# Patient Record
Sex: FEMALE | Race: WHITE | NOT HISPANIC OR LATINO | Employment: OTHER | URBAN - METROPOLITAN AREA
[De-identification: names, ages, dates, MRNs, and addresses within clinical notes are randomized per-mention and may not be internally consistent; named-entity substitution may affect disease eponyms.]

---

## 2023-04-07 PROBLEM — M70.70 BURSITIS OF HIP: Status: ACTIVE | Noted: 2023-04-07

## 2023-04-07 PROBLEM — H02.401 PTOSIS OF RIGHT UPPER EYELID: Status: ACTIVE | Noted: 2023-04-07

## 2023-04-07 PROBLEM — I10 ESSENTIAL HYPERTENSION: Status: ACTIVE | Noted: 2023-04-07

## 2023-04-07 PROBLEM — N39.46 URGE AND STRESS INCONTINENCE: Status: ACTIVE | Noted: 2023-04-07

## 2023-04-07 PROBLEM — E03.9 HYPOTHYROIDISM: Status: ACTIVE | Noted: 2023-04-07

## 2023-04-07 PROBLEM — K04.7 DENTAL ABSCESS: Status: ACTIVE | Noted: 2023-04-07

## 2023-04-07 PROBLEM — E55.9 VITAMIN D DEFICIENCY: Status: ACTIVE | Noted: 2023-04-07

## 2023-04-07 PROBLEM — E04.9 GOITER: Status: ACTIVE | Noted: 2023-04-07

## 2023-04-07 PROBLEM — M17.9 OSTEOARTHRITIS OF KNEE: Status: ACTIVE | Noted: 2023-04-07

## 2023-04-07 PROBLEM — M15.9 POLYOSTEOARTHRITIS, UNSPECIFIED: Status: ACTIVE | Noted: 2023-04-07

## 2023-04-07 PROBLEM — N95.9 MENOPAUSAL AND POSTMENOPAUSAL DISORDER: Status: ACTIVE | Noted: 2023-04-07

## 2023-04-07 RX ORDER — PERPHENAZINE 2 MG
TABLET ORAL
COMMUNITY

## 2023-04-07 RX ORDER — HYDROCHLOROTHIAZIDE 25 MG/1
1 TABLET ORAL DAILY
COMMUNITY
End: 2023-04-11 | Stop reason: SDUPTHER

## 2023-04-07 RX ORDER — TOLTERODINE 4 MG/1
1 CAPSULE, EXTENDED RELEASE ORAL DAILY
COMMUNITY
Start: 2021-05-11 | End: 2023-04-11 | Stop reason: SDUPTHER

## 2023-04-07 RX ORDER — NIFEDIPINE 30 MG/1
1 TABLET, FILM COATED, EXTENDED RELEASE ORAL DAILY
COMMUNITY
End: 2023-04-11 | Stop reason: SDUPTHER

## 2023-04-07 RX ORDER — CALCIUM CARBONATE 600 MG
600 TABLET ORAL
COMMUNITY

## 2023-04-07 RX ORDER — LEVOTHYROXINE SODIUM 125 UG/1
1 TABLET ORAL DAILY
COMMUNITY
Start: 2019-11-18 | End: 2023-04-11 | Stop reason: SDUPTHER

## 2023-04-09 ASSESSMENT — ENCOUNTER SYMPTOMS
SHORTNESS OF BREATH: 0
ORTHOPNEA: 0
HYPERTENSION: 1
PALPITATIONS: 0
SWEATS: 0
NECK PAIN: 0
HEADACHES: 0
PND: 0
BLURRED VISION: 0

## 2023-04-11 ENCOUNTER — LAB (OUTPATIENT)
Dept: LAB | Facility: LAB | Age: 68
End: 2023-04-11
Payer: MEDICARE

## 2023-04-11 ENCOUNTER — OFFICE VISIT (OUTPATIENT)
Dept: PRIMARY CARE | Facility: CLINIC | Age: 68
End: 2023-04-11
Payer: MEDICARE

## 2023-04-11 VITALS
SYSTOLIC BLOOD PRESSURE: 130 MMHG | HEIGHT: 72 IN | HEART RATE: 84 BPM | RESPIRATION RATE: 14 BRPM | TEMPERATURE: 97.8 F | DIASTOLIC BLOOD PRESSURE: 83 MMHG | BODY MASS INDEX: 28.44 KG/M2 | WEIGHT: 210 LBS | OXYGEN SATURATION: 96 %

## 2023-04-11 DIAGNOSIS — E03.8 HYPOTHYROIDISM DUE TO HASHIMOTO'S THYROIDITIS: ICD-10-CM

## 2023-04-11 DIAGNOSIS — E04.9 GOITER: ICD-10-CM

## 2023-04-11 DIAGNOSIS — I10 ESSENTIAL HYPERTENSION: Primary | ICD-10-CM

## 2023-04-11 DIAGNOSIS — Z00.00 ENCOUNTER FOR MEDICARE ANNUAL WELLNESS EXAM: ICD-10-CM

## 2023-04-11 DIAGNOSIS — E06.3 HYPOTHYROIDISM DUE TO HASHIMOTO'S THYROIDITIS: ICD-10-CM

## 2023-04-11 DIAGNOSIS — D64.9 ANEMIA, UNSPECIFIED TYPE: ICD-10-CM

## 2023-04-11 DIAGNOSIS — N39.46 URGE AND STRESS INCONTINENCE: ICD-10-CM

## 2023-04-11 PROBLEM — K04.7 DENTAL ABSCESS: Status: RESOLVED | Noted: 2023-04-07 | Resolved: 2023-04-11

## 2023-04-11 LAB
ERYTHROCYTE DISTRIBUTION WIDTH (RATIO) BY AUTOMATED COUNT: 13.7 % (ref 11.5–14.5)
ERYTHROCYTE MEAN CORPUSCULAR HEMOGLOBIN CONCENTRATION (G/DL) BY AUTOMATED: 32.9 G/DL (ref 32–36)
ERYTHROCYTE MEAN CORPUSCULAR VOLUME (FL) BY AUTOMATED COUNT: 98 FL (ref 80–100)
ERYTHROCYTES (10*6/UL) IN BLOOD BY AUTOMATED COUNT: 4.66 X10E12/L (ref 4–5.2)
HEMATOCRIT (%) IN BLOOD BY AUTOMATED COUNT: 45.6 % (ref 36–46)
HEMOGLOBIN (G/DL) IN BLOOD: 15 G/DL (ref 12–16)
LEUKOCYTES (10*3/UL) IN BLOOD BY AUTOMATED COUNT: 5 X10E9/L (ref 4.4–11.3)
PLATELETS (10*3/UL) IN BLOOD AUTOMATED COUNT: 270 X10E9/L (ref 150–450)
THYROTROPIN (MIU/L) IN SER/PLAS BY DETECTION LIMIT <= 0.05 MIU/L: 1.06 MIU/L (ref 0.44–3.98)
THYROXINE (T4) FREE (NG/DL) IN SER/PLAS: 1.24 NG/DL (ref 0.61–1.12)

## 2023-04-11 PROCEDURE — 36415 COLL VENOUS BLD VENIPUNCTURE: CPT

## 2023-04-11 PROCEDURE — 85027 COMPLETE CBC AUTOMATED: CPT

## 2023-04-11 PROCEDURE — 1159F MED LIST DOCD IN RCRD: CPT

## 2023-04-11 PROCEDURE — 84443 ASSAY THYROID STIM HORMONE: CPT

## 2023-04-11 PROCEDURE — 99214 OFFICE O/P EST MOD 30 MIN: CPT

## 2023-04-11 PROCEDURE — 3075F SYST BP GE 130 - 139MM HG: CPT

## 2023-04-11 PROCEDURE — 1036F TOBACCO NON-USER: CPT

## 2023-04-11 PROCEDURE — 84439 ASSAY OF FREE THYROXINE: CPT

## 2023-04-11 PROCEDURE — 3079F DIAST BP 80-89 MM HG: CPT

## 2023-04-11 RX ORDER — LEVOTHYROXINE SODIUM 125 UG/1
125 TABLET ORAL DAILY
Qty: 90 TABLET | Refills: 3 | Status: SHIPPED | OUTPATIENT
Start: 2023-04-11 | End: 2024-04-23 | Stop reason: SDUPTHER

## 2023-04-11 RX ORDER — TOLTERODINE 4 MG/1
4 CAPSULE, EXTENDED RELEASE ORAL DAILY
Qty: 90 CAPSULE | Refills: 3 | Status: SHIPPED | OUTPATIENT
Start: 2023-04-11 | End: 2024-04-10

## 2023-04-11 RX ORDER — HYDROCHLOROTHIAZIDE 25 MG/1
25 TABLET ORAL DAILY
Qty: 90 TABLET | Refills: 3 | Status: SHIPPED | OUTPATIENT
Start: 2023-04-11 | End: 2024-04-23 | Stop reason: SDUPTHER

## 2023-04-11 RX ORDER — NIFEDIPINE 30 MG/1
30 TABLET, FILM COATED, EXTENDED RELEASE ORAL DAILY
Qty: 90 TABLET | Refills: 3 | Status: SHIPPED | OUTPATIENT
Start: 2023-04-11 | End: 2024-04-23 | Stop reason: SDUPTHER

## 2023-04-11 ASSESSMENT — ENCOUNTER SYMPTOMS
CARDIOVASCULAR NEGATIVE: 1
HEADACHES: 0
LOSS OF SENSATION IN FEET: 0
GASTROINTESTINAL NEGATIVE: 1
ENDOCRINE NEGATIVE: 1
PSYCHIATRIC NEGATIVE: 1
RESPIRATORY NEGATIVE: 1
SHORTNESS OF BREATH: 0
NEUROLOGICAL NEGATIVE: 1
OCCASIONAL FEELINGS OF UNSTEADINESS: 0
PALPITATIONS: 0
HEMATOLOGIC/LYMPHATIC NEGATIVE: 1
DEPRESSION: 0
MUSCULOSKELETAL NEGATIVE: 1
EYES NEGATIVE: 1
NECK PAIN: 0
CONSTITUTIONAL NEGATIVE: 1

## 2023-04-11 ASSESSMENT — PATIENT HEALTH QUESTIONNAIRE - PHQ9
1. LITTLE INTEREST OR PLEASURE IN DOING THINGS: NOT AT ALL
2. FEELING DOWN, DEPRESSED OR HOPELESS: NOT AT ALL
SUM OF ALL RESPONSES TO PHQ9 QUESTIONS 1 & 2: 0

## 2023-04-11 ASSESSMENT — LIFESTYLE VARIABLES
AUDIT-C TOTAL SCORE: 0
HOW MANY STANDARD DRINKS CONTAINING ALCOHOL DO YOU HAVE ON A TYPICAL DAY: PATIENT DOES NOT DRINK
HOW OFTEN DO YOU HAVE SIX OR MORE DRINKS ON ONE OCCASION: NEVER
HOW OFTEN DO YOU HAVE A DRINK CONTAINING ALCOHOL: NEVER
SKIP TO QUESTIONS 9-10: 1

## 2023-04-11 NOTE — PROGRESS NOTES
Answers submitted by the patient for this visit:  High Blood Pressure Questionnaire (Submitted on 4/9/2023)  Chief Complaint: Hypertension  Chronicity: chronic  Onset: more than 1 year ago  Progression since onset: waxing and waning  Condition status: controlled  anxiety: No  blurred vision: No  chest pain: No  headaches: No  malaise/fatigue: No  neck pain: No  orthopnea: No  palpitations: No  peripheral edema: No  PND: No  shortness of breath: No  sweats: No  Agents associated with hypertension: NSAIDs, thyroid hormones  CAD risks: family history, post-menopausal state  Compliance problems: no compliance problems    Subjective   Patient ID: Candy Maya is a 67 y.o. female who presents for visit to [facility] care and 6 month follow up visit.    Diet: More veggies, low carbs, low fat, higher protein. No sugary beverages, sweets, trying to avoid processed foods/ chips.   Exercise: Going to the gym 4-5 times per week for about 1 hour per time (20 minutes bike or treadmill, rest of the time on machines)  Weight: Down 10 lbs since last visit  Water: 90 oz water minimum per day. 2 cups coffee per day  Sleep: Not getting a lot of deep sleep per her Fitbit, 7-8 hours per night. Sleep score in high 80s per fitbit  Social: , lives in a small cottage on [address]. Used to live around [place], hasn't found a doctor in [place] yet.   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    HENT: Negative.     Eyes: Negative.    Respiratory: Negative.  Negative for shortness of breath.    Cardiovascular: Negative.  Negative for chest pain and palpitations.   Gastrointestinal: Negative.    Endocrine: Negative.    Genitourinary: Negative.    Musculoskeletal: Negative.  Negative for neck pain.   Skin: Negative.    Neurological: Negative.  Negative for headaches.   Hematological: Negative.    Psychiatric/Behavioral: Negative.          Current Outpatient Medications   Medication Sig  "Dispense Refill    calcium carbonate 600 mg calcium (1,500 mg) tablet Take 1 tablet (600 mg) by mouth in the morning and 1 tablet (600 mg) in the evening. Take with meals.      fish,bora,flax oils-om3,6,9no1 (Omega 3-6-9) 1,200 mg capsule Take by mouth.      multivitamin with minerals (multivitamin-iron-folic acid) tablet Take 1 tablet by mouth once daily.      hydroCHLOROthiazide (HYDRODiuril) 25 mg tablet Take 1 tablet (25 mg) by mouth once daily. 90 tablet 3    levothyroxine (Synthroid, Levoxyl) 125 mcg tablet Take 1 tablet (125 mcg) by mouth once daily. 90 tablet 3    NIFEdipine CC (Adalat CC) 30 mg 24 hr tablet Take 1 tablet (30 mg) by mouth once daily. 90 tablet 3    tolterodine LA (Detrol LA) 4 mg 24 hr capsule Take 1 capsule (4 mg) by mouth once daily. 90 capsule 3     No current facility-administered medications for this visit.     Past Surgical History:   Procedure Laterality Date    OTHER SURGICAL HISTORY  11/12/2019    Bladder surgery    OTHER SURGICAL HISTORY  11/12/2019    Varicose vein ligation    OTHER SURGICAL HISTORY  11/12/2019    Jaw surgery    OTHER SURGICAL HISTORY  11/12/2019    Appendectomy    OTHER SURGICAL HISTORY  11/12/2019    Cholecystectomy    OTHER SURGICAL HISTORY  11/12/2019    Hysterectomy    OTHER SURGICAL HISTORY  11/18/2019    Cataract surgery    OTHER SURGICAL HISTORY  10/17/2022    Knee replacement     Family History   Problem Relation Name Age of Onset    Breast cancer Paternal Grandmother      Cancer Other        Social History     Tobacco Use    Smoking status: Never     Passive exposure: Never    Smokeless tobacco: Never   Vaping Use    Vaping status: Never Used   Substance Use Topics    Alcohol use: Not Currently    Drug use: Never        Objective     Visit Vitals  /83 (BP Location: Left arm, Patient Position: Sitting, BP Cuff Size: Large adult)   Pulse 84   Temp 36.6 °C (97.8 °F)   Resp 14   Ht 1.835 m (6' 0.25\")   Wt 95.3 kg (210 lb)   SpO2 96%   BMI 28.28 kg/m² "   Smoking Status Never   BSA 2.2 m²        Physical Exam  Constitutional:       Appearance: Normal appearance.   HENT:      Head: Normocephalic and atraumatic.   Eyes:      Extraocular Movements: Extraocular movements intact.      Pupils: Pupils are equal, round, and reactive to light.   Neck:      Comments: Goiter  Cardiovascular:      Rate and Rhythm: Normal rate and regular rhythm.   Pulmonary:      Effort: Pulmonary effort is normal.      Breath sounds: Normal breath sounds.   Abdominal:      General: Abdomen is flat. Bowel sounds are normal.      Palpations: Abdomen is soft.   Musculoskeletal:         General: Normal range of motion.   Neurological:      General: No focal deficit present.      Mental Status: She is alert and oriented to person, place, and time.   Psychiatric:         Mood and Affect: Mood normal.         Behavior: Behavior normal.           Assessment/Plan   Problem List Items Addressed This Visit       Essential hypertension - Primary     Generally well controlled in office today at 130/83  Continue hydrochlorothiazide 25mg daily and nifedipine 30mg daily  Advised to check blood pressures a few times per month and record values         Relevant Medications    NIFEdipine CC (Adalat CC) 30 mg 24 hr tablet    hydroCHLOROthiazide (HYDRODiuril) 25 mg tablet    Goiter    Hypothyroidism    Relevant Medications    levothyroxine (Synthroid, Levoxyl) 125 mcg tablet    Other Relevant Orders    T4, free    TSH    Urge and stress incontinence    Relevant Medications    tolterodine LA (Detrol LA) 4 mg 24 hr capsule    Anemia     Repeat CBC  Add on Iron/ferritin/B12 as needed pending results             Relevant Orders    CBC       All pertinent lab work and results were reviewed with patient.     Follow up with me in 6 months or sooner as needed    Janie Malhotra, APRN-CNS

## 2023-04-11 NOTE — ASSESSMENT & PLAN NOTE
Generally well controlled in office today at 130/83  Continue hydrochlorothiazide 25mg daily and nifedipine 30mg daily  Advised to check blood pressures a few times per month and record values

## 2023-04-11 NOTE — PATIENT INSTRUCTIONS
Thank you for coming to see me today.  If you have any questions or concerns following our visit, please contact the office.  Phone: [phone]    Follow up with me in 6 months or sooner as needed    1)  Check blood pressures a few times per month, record values, let me know if top number consistently greater than 140 or bottom number consistently greater than 86    2) Get labs today on your way out to check for anemia and thyroid function

## 2023-04-12 ENCOUNTER — TELEPHONE (OUTPATIENT)
Dept: PRIMARY CARE | Facility: CLINIC | Age: 68
End: 2023-04-12

## 2023-06-28 NOTE — RESULT ENCOUNTER NOTE
T4 a little high but if she's not feeling anxious, heat intolerant or any other symptoms of high thyroid will leave levothyroxine dose the same for now.  Labs otherwise look ok

## 2023-10-11 ENCOUNTER — TELEMEDICINE (OUTPATIENT)
Dept: PRIMARY CARE | Facility: CLINIC | Age: 68
End: 2023-10-11
Payer: MEDICARE

## 2023-10-11 DIAGNOSIS — I10 ESSENTIAL HYPERTENSION: ICD-10-CM

## 2023-10-11 DIAGNOSIS — Z12.31 BREAST CANCER SCREENING BY MAMMOGRAM: ICD-10-CM

## 2023-10-11 DIAGNOSIS — Z00.00 ENCOUNTER FOR MEDICARE ANNUAL WELLNESS EXAM: ICD-10-CM

## 2023-10-11 DIAGNOSIS — R79.89 ABNORMAL CBC: ICD-10-CM

## 2023-10-11 DIAGNOSIS — D64.9 ANEMIA, UNSPECIFIED TYPE: ICD-10-CM

## 2023-10-11 DIAGNOSIS — E03.9 HYPOTHYROIDISM, UNSPECIFIED TYPE: ICD-10-CM

## 2023-10-11 DIAGNOSIS — E55.9 VITAMIN D DEFICIENCY: ICD-10-CM

## 2023-10-11 DIAGNOSIS — Z78.0 POST-MENOPAUSAL: Primary | ICD-10-CM

## 2023-10-11 PROCEDURE — 99214 OFFICE O/P EST MOD 30 MIN: CPT

## 2023-10-11 ASSESSMENT — ENCOUNTER SYMPTOMS
HEMATOLOGIC/LYMPHATIC NEGATIVE: 1
EYES NEGATIVE: 1
MUSCULOSKELETAL NEGATIVE: 1
GASTROINTESTINAL NEGATIVE: 1
ENDOCRINE NEGATIVE: 1
PSYCHIATRIC NEGATIVE: 1
RESPIRATORY NEGATIVE: 1
CARDIOVASCULAR NEGATIVE: 1
NEUROLOGICAL NEGATIVE: 1
CONSTITUTIONAL NEGATIVE: 1

## 2023-10-11 NOTE — PROGRESS NOTES
Subjective   Patient ID: Candy Maya is a 68 y.o. female who presents for follow up of hypertension.    Checking blood pressure at home ranging 120s/80s at home, checking them about a few times per month.   A few weeks ago  Planning to leave for [place] in the coming weeks. Will be there through first of April.     Diet: More veggies, low carbs, low fat, higher protein. No sugary beverages, sweets, trying to avoid processed foods/ chips.   Exercise: Going to the gym 4-5 times per week for about 1 hour per time (20 minutes bike or treadmill, rest of the time on machines)  Weight: Down 10 lbs since last visit  Water: 90 oz water minimum per day. 2 cups coffee per day  Sleep: Not getting a lot of deep sleep per her Fitbit, 7-8 hours per night. Sleep score in high 80s per fitbit  Social: , [facility] [place] [facility] [place] [address]. Used to live around [place], hasn't found a doctor in [place] yet.   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    HENT: Negative.     Eyes: Negative.    Respiratory: Negative.     Cardiovascular: Negative.    Gastrointestinal: Negative.    Endocrine: Negative.    Genitourinary: Negative.    Musculoskeletal: Negative.    Skin: Negative.    Neurological: Negative.    Hematological: Negative.    Psychiatric/Behavioral: Negative.          Current Outpatient Medications   Medication Sig Dispense Refill    calcium carbonate 600 mg calcium (1,500 mg) tablet Take 1 tablet (600 mg) by mouth in the morning and 1 tablet (600 mg) in the evening. Take with meals.      fish,bora,flax oils-om3,6,9no1 (Omega 3-6-9) 1,200 mg capsule Take by mouth.      hydroCHLOROthiazide (HYDRODiuril) 25 mg tablet Take 1 tablet (25 mg) by mouth once daily. 90 tablet 3    levothyroxine (Synthroid, Levoxyl) 125 mcg tablet Take 1 tablet (125 mcg) by mouth once daily. 90 tablet 3    multivitamin with minerals (multivitamin-iron-folic acid) tablet Take 1 tablet by mouth  once daily.      NIFEdipine CC (Adalat CC) 30 mg 24 hr tablet Take 1 tablet (30 mg) by mouth once daily. 90 tablet 3    tolterodine LA (Detrol LA) 4 mg 24 hr capsule Take 1 capsule (4 mg) by mouth once daily. 90 capsule 3     No current facility-administered medications for this visit.     Past Surgical History:   Procedure Laterality Date    APPENDECTOMY  35 yrs ago    CHOLECYSTECTOMY  20 yrs ago    COSMETIC SURGERY  20 yrs ago    HYSTERECTOMY  02/2010    JOINT REPLACEMENT  06/2022    OTHER SURGICAL HISTORY  11/12/2019    Bladder surgery    OTHER SURGICAL HISTORY  11/12/2019    Varicose vein ligation    OTHER SURGICAL HISTORY  11/12/2019    Jaw surgery    OTHER SURGICAL HISTORY  11/12/2019    Appendectomy    OTHER SURGICAL HISTORY  11/12/2019    Cholecystectomy    OTHER SURGICAL HISTORY  11/12/2019    Hysterectomy    OTHER SURGICAL HISTORY  11/18/2019    Cataract surgery    OTHER SURGICAL HISTORY  10/17/2022    Knee replacement    TONSILLECTOMY  60 yrs ago    WISDOM TOOTH EXTRACTION  45 yrs ago     Family History   Problem Relation Name Age of Onset    Breast cancer Paternal Grandmother Flory Ryan     Cancer Paternal Grandmother Flory Davis     Cancer Other      Arthritis Mother Angely Davis     Hearing loss Mother Angely Davis     Heart disease Mother Angely Davis     Hearing loss Father Amadeo Davis     Vision loss Father Amadeo Davis     Cancer Maternal Grandmother Nancy Rosado     Accidental death Brother Tristan Davis     Accidental death Sister Nuvia Davis       Social History     Tobacco Use    Smoking status: Never     Passive exposure: Never    Smokeless tobacco: Never   Vaping Use    Vaping Use: Never used   Substance Use Topics    Alcohol use: Yes     Alcohol/week: 4.0 - 5.0 standard drinks of alcohol     Types: 2 Cans of beer, 2 - 3 Shots of liquor per week     Comment: Very occasionally/1-2 times per month    Drug use: Never        Objective     Visit Vitals  Smoking Status Never         Physical Exam  Constitutional:       Appearance: Normal appearance.   HENT:      Head: Normocephalic and atraumatic.   Eyes:      Extraocular Movements: Extraocular movements intact.      Pupils: Pupils are equal, round, and reactive to light.   Musculoskeletal:         General: Normal range of motion.   Neurological:      General: No focal deficit present.      Mental Status: She is alert and oriented to person, place, and time.   Psychiatric:         Mood and Affect: Mood normal.         Behavior: Behavior normal.           Assessment/Plan   Problem List Items Addressed This Visit       Essential hypertension     Home blood pressures running 120/80s, checked a few times over the past 6 months    Continue hydrochlorothiazide 25mg daily and nifedipine 30mg daily         Relevant Orders    CBC    Comprehensive Metabolic Panel    Lipid Panel    Hypothyroidism     TSH WNL, T4 elevated but patient feeling well, without heat intolerate, anxiety, tachycardia, etc    Continue levothyroxine 125mcg daily   Repeat TSH w/ reflex to T4         Relevant Orders    TSH with reflex to Free T4 if abnormal    Vitamin D deficiency    Relevant Orders    Vitamin D 25-Hydroxy,Total (for eval of Vitamin D levels)    Anemia     Noted to have low MCHC and mildly decreased H/H on CBC from 9/2022    Repeat CBC; check B12, iron studies          Other Visit Diagnoses       Post-menopausal    -  Primary    Relevant Orders    XR DEXA bone density    Encounter for Medicare annual wellness exam        Breast cancer screening by mammogram        Relevant Orders    BI mammo bilateral screening tomosynthesis    Abnormal CBC        Relevant Orders    Ferritin    Iron and TIBC    Vitamin B12            All pertinent lab work and results were reviewed with patient.     Follow up with me in 6 months or sooner as needed    GEOFF Heath-CNS

## 2023-10-11 NOTE — PATIENT INSTRUCTIONS
Thank you for coming to see me today.  If you have any questions or concerns following our visit, please contact the office.  Phone: [phone]    Follow up with me in 6 months or sooner as needed    1)  Get fasting labwork in the next 1-2 weeks.  The lab is down the yi from our office.     2) Please schedule a mammogram and bone density scan - please call [phone] or stop to 's office (in the lab office) on your way out today.     3)

## 2023-10-11 NOTE — ASSESSMENT & PLAN NOTE
Noted to have low MCHC and mildly decreased H/H on CBC from 9/2022    Repeat CBC; check B12, iron studies

## 2023-10-11 NOTE — ASSESSMENT & PLAN NOTE
Home blood pressures running 120/80s, checked a few times over the past 6 months    Continue hydrochlorothiazide 25mg daily and nifedipine 30mg daily

## 2023-10-11 NOTE — ASSESSMENT & PLAN NOTE
TSH WNL, T4 elevated but patient feeling well, without heat intolerate, anxiety, tachycardia, etc    Continue levothyroxine 125mcg daily   Repeat TSH w/ reflex to T4

## 2023-10-17 ENCOUNTER — LAB (OUTPATIENT)
Dept: LAB | Facility: LAB | Age: 68
End: 2023-10-17
Payer: MEDICARE

## 2023-10-17 DIAGNOSIS — I10 ESSENTIAL HYPERTENSION: ICD-10-CM

## 2023-10-17 DIAGNOSIS — E55.9 VITAMIN D DEFICIENCY: ICD-10-CM

## 2023-10-17 DIAGNOSIS — R79.89 ABNORMAL CBC: ICD-10-CM

## 2023-10-17 DIAGNOSIS — E03.9 HYPOTHYROIDISM, UNSPECIFIED TYPE: ICD-10-CM

## 2023-10-17 LAB
25(OH)D3 SERPL-MCNC: 58 NG/ML (ref 30–100)
ALBUMIN SERPL BCP-MCNC: 3.9 G/DL (ref 3.4–5)
ALP SERPL-CCNC: 57 U/L (ref 33–136)
ALT SERPL W P-5'-P-CCNC: 13 U/L (ref 7–45)
ANION GAP SERPL CALC-SCNC: 10 MMOL/L (ref 10–20)
AST SERPL W P-5'-P-CCNC: 15 U/L (ref 9–39)
BILIRUB SERPL-MCNC: 0.5 MG/DL (ref 0–1.2)
BUN SERPL-MCNC: 14 MG/DL (ref 6–23)
CALCIUM SERPL-MCNC: 9.2 MG/DL (ref 8.6–10.3)
CHLORIDE SERPL-SCNC: 103 MMOL/L (ref 98–107)
CHOLEST SERPL-MCNC: 192 MG/DL (ref 0–199)
CHOLESTEROL/HDL RATIO: 3.7
CO2 SERPL-SCNC: 33 MMOL/L (ref 21–32)
CREAT SERPL-MCNC: 0.84 MG/DL (ref 0.5–1.05)
ERYTHROCYTE [DISTWIDTH] IN BLOOD BY AUTOMATED COUNT: 12.9 % (ref 11.5–14.5)
FERRITIN SERPL-MCNC: 55 NG/ML (ref 8–150)
GFR SERPL CREATININE-BSD FRML MDRD: 77 ML/MIN/1.73M*2
GLUCOSE SERPL-MCNC: 87 MG/DL (ref 74–99)
HCT VFR BLD AUTO: 42.5 % (ref 36–46)
HDLC SERPL-MCNC: 51.6 MG/DL
HGB BLD-MCNC: 13.8 G/DL (ref 12–16)
IRON SATN MFR SERPL: 32 % (ref 25–45)
IRON SERPL-MCNC: 105 UG/DL (ref 35–150)
LDLC SERPL CALC-MCNC: 114 MG/DL
MCH RBC QN AUTO: 31.6 PG (ref 26–34)
MCHC RBC AUTO-ENTMCNC: 32.5 G/DL (ref 32–36)
MCV RBC AUTO: 97 FL (ref 80–100)
NON HDL CHOLESTEROL: 140 MG/DL (ref 0–149)
NRBC BLD-RTO: 0 /100 WBCS (ref 0–0)
PLATELET # BLD AUTO: 242 X10*3/UL (ref 150–450)
PMV BLD AUTO: 9.7 FL (ref 7.5–11.5)
POTASSIUM SERPL-SCNC: 4 MMOL/L (ref 3.5–5.3)
PROT SERPL-MCNC: 6.5 G/DL (ref 6.4–8.2)
RBC # BLD AUTO: 4.37 X10*6/UL (ref 4–5.2)
SODIUM SERPL-SCNC: 142 MMOL/L (ref 136–145)
TIBC SERPL-MCNC: 331 UG/DL (ref 240–445)
TRIGL SERPL-MCNC: 130 MG/DL (ref 0–149)
TSH SERPL-ACNC: 1.94 MIU/L (ref 0.44–3.98)
UIBC SERPL-MCNC: 226 UG/DL (ref 110–370)
VIT B12 SERPL-MCNC: 377 PG/ML (ref 211–911)
VLDL: 26 MG/DL (ref 0–40)
WBC # BLD AUTO: 4.6 X10*3/UL (ref 4.4–11.3)

## 2023-10-17 PROCEDURE — 83540 ASSAY OF IRON: CPT

## 2023-10-17 PROCEDURE — 82306 VITAMIN D 25 HYDROXY: CPT

## 2023-10-17 PROCEDURE — 84443 ASSAY THYROID STIM HORMONE: CPT

## 2023-10-17 PROCEDURE — 82728 ASSAY OF FERRITIN: CPT

## 2023-10-17 PROCEDURE — 85027 COMPLETE CBC AUTOMATED: CPT

## 2023-10-17 PROCEDURE — 80053 COMPREHEN METABOLIC PANEL: CPT

## 2023-10-17 PROCEDURE — 82607 VITAMIN B-12: CPT

## 2023-10-17 PROCEDURE — 83550 IRON BINDING TEST: CPT

## 2023-10-17 PROCEDURE — 36415 COLL VENOUS BLD VENIPUNCTURE: CPT

## 2023-10-17 PROCEDURE — 80061 LIPID PANEL: CPT

## 2023-10-25 ENCOUNTER — ANCILLARY PROCEDURE (OUTPATIENT)
Dept: RADIOLOGY | Facility: CLINIC | Age: 68
End: 2023-10-25
Payer: MEDICARE

## 2023-10-25 DIAGNOSIS — Z78.0 POST-MENOPAUSAL: ICD-10-CM

## 2023-10-25 DIAGNOSIS — Z12.31 BREAST CANCER SCREENING BY MAMMOGRAM: ICD-10-CM

## 2023-10-25 PROCEDURE — 77080 DXA BONE DENSITY AXIAL: CPT

## 2023-10-25 PROCEDURE — 77067 SCR MAMMO BI INCL CAD: CPT

## 2023-10-25 PROCEDURE — 77063 BREAST TOMOSYNTHESIS BI: CPT | Performed by: RADIOLOGY

## 2023-10-25 PROCEDURE — 77067 SCR MAMMO BI INCL CAD: CPT | Performed by: RADIOLOGY

## 2023-10-25 PROCEDURE — 77080 DXA BONE DENSITY AXIAL: CPT | Performed by: RADIOLOGY

## 2024-04-23 ENCOUNTER — OFFICE VISIT (OUTPATIENT)
Dept: PRIMARY CARE | Facility: CLINIC | Age: 69
End: 2024-04-23
Payer: MEDICARE

## 2024-04-23 VITALS
SYSTOLIC BLOOD PRESSURE: 130 MMHG | HEIGHT: 72 IN | HEART RATE: 87 BPM | TEMPERATURE: 96.8 F | WEIGHT: 210.2 LBS | OXYGEN SATURATION: 95 % | DIASTOLIC BLOOD PRESSURE: 88 MMHG | BODY MASS INDEX: 28.47 KG/M2 | RESPIRATION RATE: 20 BRPM

## 2024-04-23 DIAGNOSIS — E03.8 HYPOTHYROIDISM DUE TO HASHIMOTO'S THYROIDITIS: ICD-10-CM

## 2024-04-23 DIAGNOSIS — M85.80 OSTEOPENIA, UNSPECIFIED LOCATION: ICD-10-CM

## 2024-04-23 DIAGNOSIS — N39.46 URGE AND STRESS INCONTINENCE: ICD-10-CM

## 2024-04-23 DIAGNOSIS — Z00.00 ROUTINE GENERAL MEDICAL EXAMINATION AT HEALTH CARE FACILITY: Primary | ICD-10-CM

## 2024-04-23 DIAGNOSIS — Z00.00 ENCOUNTER FOR MEDICARE ANNUAL WELLNESS EXAM: ICD-10-CM

## 2024-04-23 DIAGNOSIS — I10 ESSENTIAL HYPERTENSION: ICD-10-CM

## 2024-04-23 DIAGNOSIS — E06.3 HYPOTHYROIDISM DUE TO HASHIMOTO'S THYROIDITIS: ICD-10-CM

## 2024-04-23 PROBLEM — N81.2 UTEROVAGINAL PROLAPSE, INCOMPLETE: Status: ACTIVE | Noted: 2024-04-23

## 2024-04-23 PROCEDURE — 3079F DIAST BP 80-89 MM HG: CPT

## 2024-04-23 PROCEDURE — 3075F SYST BP GE 130 - 139MM HG: CPT

## 2024-04-23 PROCEDURE — 1126F AMNT PAIN NOTED NONE PRSNT: CPT

## 2024-04-23 PROCEDURE — 1170F FXNL STATUS ASSESSED: CPT

## 2024-04-23 PROCEDURE — G0439 PPPS, SUBSEQ VISIT: HCPCS

## 2024-04-23 PROCEDURE — 1159F MED LIST DOCD IN RCRD: CPT

## 2024-04-23 PROCEDURE — 99214 OFFICE O/P EST MOD 30 MIN: CPT

## 2024-04-23 PROCEDURE — 1160F RVW MEDS BY RX/DR IN RCRD: CPT

## 2024-04-23 PROCEDURE — 99397 PER PM REEVAL EST PAT 65+ YR: CPT

## 2024-04-23 RX ORDER — TOLTERODINE 4 MG/1
4 CAPSULE, EXTENDED RELEASE ORAL DAILY
COMMUNITY
End: 2024-04-23 | Stop reason: SDUPTHER

## 2024-04-23 RX ORDER — LEVOTHYROXINE SODIUM 125 UG/1
125 TABLET ORAL DAILY
Qty: 90 TABLET | Refills: 3 | Status: SHIPPED | OUTPATIENT
Start: 2024-04-23 | End: 2025-04-23

## 2024-04-23 RX ORDER — HYDROCHLOROTHIAZIDE 25 MG/1
25 TABLET ORAL DAILY
Qty: 90 TABLET | Refills: 3 | Status: SHIPPED | OUTPATIENT
Start: 2024-04-23 | End: 2025-04-23

## 2024-04-23 RX ORDER — TOLTERODINE 4 MG/1
4 CAPSULE, EXTENDED RELEASE ORAL DAILY
Qty: 90 CAPSULE | Refills: 3 | Status: SHIPPED | OUTPATIENT
Start: 2024-04-23

## 2024-04-23 RX ORDER — NIFEDIPINE 30 MG/1
30 TABLET, FILM COATED, EXTENDED RELEASE ORAL DAILY
Qty: 90 TABLET | Refills: 3 | Status: SHIPPED | OUTPATIENT
Start: 2024-04-23 | End: 2025-04-23

## 2024-04-23 ASSESSMENT — ENCOUNTER SYMPTOMS
HYPERTENSION: 1
CONSTITUTIONAL NEGATIVE: 1
DEPRESSION: 0
ENDOCRINE NEGATIVE: 1
NECK PAIN: 0
ORTHOPNEA: 0
BLURRED VISION: 0
SWEATS: 0
CARDIOVASCULAR NEGATIVE: 1
PND: 0
MUSCULOSKELETAL NEGATIVE: 1
OCCASIONAL FEELINGS OF UNSTEADINESS: 0
PALPITATIONS: 0
LOSS OF SENSATION IN FEET: 0
HEMATOLOGIC/LYMPHATIC NEGATIVE: 1
PSYCHIATRIC NEGATIVE: 1
EYES NEGATIVE: 1
GASTROINTESTINAL NEGATIVE: 1
NEUROLOGICAL NEGATIVE: 1
SHORTNESS OF BREATH: 0
RESPIRATORY NEGATIVE: 1
HEADACHES: 0

## 2024-04-23 ASSESSMENT — ACTIVITIES OF DAILY LIVING (ADL)
BATHING: INDEPENDENT
MANAGING_FINANCES: INDEPENDENT
GROCERY_SHOPPING: INDEPENDENT
TAKING_MEDICATION: INDEPENDENT
DRESSING: INDEPENDENT
DOING_HOUSEWORK: INDEPENDENT

## 2024-04-23 ASSESSMENT — LIFESTYLE VARIABLES
HOW MANY STANDARD DRINKS CONTAINING ALCOHOL DO YOU HAVE ON A TYPICAL DAY: PATIENT DOES NOT DRINK
SKIP TO QUESTIONS 9-10: 1
AUDIT-C TOTAL SCORE: 0
HOW OFTEN DO YOU HAVE A DRINK CONTAINING ALCOHOL: NEVER
HOW OFTEN DO YOU HAVE SIX OR MORE DRINKS ON ONE OCCASION: NEVER

## 2024-04-23 ASSESSMENT — PAIN SCALES - GENERAL: PAINLEVEL: 0-NO PAIN

## 2024-04-23 NOTE — PATIENT INSTRUCTIONS
Thank you for coming to see me today.  If you have any questions or concerns following our visit, please contact the office.  Phone: [phone]    Follow up with me in 6 months or sooner as needed    1)  Get fasting labwork a few days prior to next visit.  The lab is down the yi from our office.

## 2024-04-23 NOTE — PROGRESS NOTES
"Subjective   Patient ID: Radha Maya \"Ky" is a 68 y.o. female who presents for medicare wellness visit and 6 month follow up.    Answers submitted by the patient for this visit:  High Blood Pressure Questionnaire (Submitted on 4/23/2024)  Chief Complaint: Hypertension  Chronicity: chronic  Onset: more than 1 year ago  Progression since onset: unchanged  Condition status: controlled  anxiety: No  blurred vision: No  malaise/fatigue: No  orthopnea: No  peripheral edema: No  PND: No  sweats: No  Agents associated with hypertension: NSAIDs, thyroid hormones  CAD risks: obesity  Compliance problems: no compliance problems    Home blood pressures checked a few times per month running 110-130/70-80s.    Diet: More veggies, low carbs, low fat, higher protein. No sugary beverages, sweets, trying to avoid processed foods/ chips.   Exercise: Going to the gym 4-5 times per week for about 1 hour per time (20 minutes bike or treadmill, rest of the time on machines)  Weight: Down 10 lbs since last visit  Water: 90 oz water minimum per day. 2 cups coffee per day  Sleep: Not getting a lot of deep sleep per her Fitbit, 7-8 hours per night. Sleep score in high 80s per fitbit  Social: , lives in a small cottage on [address]. Used to live around [place], hasn't found a doctor in [place] yet.   Professional: Retired  from [employer]    Review of Systems   Constitutional: Negative.    HENT: Negative.     Eyes: Negative.    Respiratory: Negative.  Negative for shortness of breath.    Cardiovascular: Negative.  Negative for chest pain and palpitations.   Gastrointestinal: Negative.    Endocrine: Negative.    Genitourinary: Negative.    Musculoskeletal: Negative.  Negative for neck pain.   Skin: Negative.    Neurological: Negative.  Negative for headaches.   Hematological: Negative.    Psychiatric/Behavioral: Negative.          Current Outpatient Medications   Medication Sig Dispense Refill    calcium " carbonate 600 mg calcium (1,500 mg) tablet Take 1 tablet (600 mg) by mouth in the morning and 1 tablet (600 mg) in the evening. Take with meals.      fish,bora,flax oils-om3,6,9no1 (Omega 3-6-9) 1,200 mg capsule Take by mouth.      multivitamin with minerals (multivitamin-iron-folic acid) tablet Take 1 tablet by mouth once daily.      hydroCHLOROthiazide (HYDRODiuril) 25 mg tablet Take 1 tablet (25 mg) by mouth once daily. 90 tablet 3    levothyroxine (Synthroid, Levoxyl) 125 mcg tablet Take 1 tablet (125 mcg) by mouth once daily. 90 tablet 3    NIFEdipine ER (Adalat CC) 30 mg 24 hr tablet Take 1 tablet (30 mg) by mouth once daily. 90 tablet 3    tolterodine LA (Detrol LA) 4 mg 24 hr capsule Take 1 capsule (4 mg) by mouth once daily. Do not crush, chew, or split. 90 capsule 3     No current facility-administered medications for this visit.     Past Surgical History:   Procedure Laterality Date    APPENDECTOMY  35 yrs ago    CHOLECYSTECTOMY  20 yrs ago    COSMETIC SURGERY  20 yrs ago    HYSTERECTOMY  02/2010    JOINT REPLACEMENT  06/2022    OTHER SURGICAL HISTORY  11/12/2019    Bladder surgery    OTHER SURGICAL HISTORY  11/12/2019    Varicose vein ligation    OTHER SURGICAL HISTORY  11/12/2019    Jaw surgery    OTHER SURGICAL HISTORY  11/12/2019    Appendectomy    OTHER SURGICAL HISTORY  11/12/2019    Cholecystectomy    OTHER SURGICAL HISTORY  11/12/2019    Hysterectomy    OTHER SURGICAL HISTORY  11/18/2019    Cataract surgery    OTHER SURGICAL HISTORY  10/17/2022    Knee replacement    TONSILLECTOMY  60 yrs ago    WISDOM TOOTH EXTRACTION  45 yrs ago     Family History   Problem Relation Name Age of Onset    Breast cancer Paternal Grandmother Flory Ryan     Cancer Paternal Grandmother Flory Davis     Cancer Other      Arthritis Mother Angely Ryan     Hearing loss Mother Angely Ryan     Heart disease Mother Angely Ryan     Hearing loss Father Amadeo Davis     Vision loss Father Amadeo Davis     Cancer  Maternal Grandmother Nancy Rosado     Accidental death Brother Tristan Davis     Accidental death Sister Nuvia Davis       Social History     Tobacco Use    Smoking status: Never     Passive exposure: Never    Smokeless tobacco: Never   Vaping Use    Vaping status: Never Used   Substance Use Topics    Alcohol use: Not Currently     Comment: Very occasionally/1-2 times per month    Drug use: Never        Objective     Visit Vitals  /88 (BP Location: Left arm, Patient Position: Sitting)   Pulse 87   Temp 36 °C (96.8 °F)   Resp 20   Ht 1.829 m (6')   Wt 95.3 kg (210 lb 3.2 oz)   SpO2 95%   BMI 28.51 kg/m²   OB Status Postmenopausal   Smoking Status Never   BSA 2.2 m²        Physical Exam  Constitutional:       Appearance: Normal appearance.   HENT:      Head: Normocephalic and atraumatic.   Eyes:      Extraocular Movements: Extraocular movements intact.      Pupils: Pupils are equal, round, and reactive to light.   Cardiovascular:      Rate and Rhythm: Normal rate and regular rhythm.   Pulmonary:      Effort: Pulmonary effort is normal.      Breath sounds: Normal breath sounds.   Abdominal:      General: Abdomen is flat. Bowel sounds are normal.      Palpations: Abdomen is soft.   Musculoskeletal:         General: Normal range of motion.   Skin:     General: Skin is warm and dry.      Capillary Refill: Capillary refill takes less than 2 seconds.   Neurological:      General: No focal deficit present.      Mental Status: She is alert and oriented to person, place, and time.   Psychiatric:         Mood and Affect: Mood normal.         Behavior: Behavior normal.           Assessment/Plan   Problem List Items Addressed This Visit       Essential hypertension     Mildly hypertensive in office at 142/82  Home blood pressures running 110-130s/70-80s    Continue hydrochlorothiazide 25mg daily and nifedipine CC 30mg daily         Relevant Medications    hydroCHLOROthiazide (HYDRODiuril) 25 mg tablet    NIFEdipine ER  (Adalat CC) 30 mg 24 hr tablet    Other Relevant Orders    CBC    Comprehensive Metabolic Panel    Lipid Panel    Hypothyroidism    Relevant Medications    levothyroxine (Synthroid, Levoxyl) 125 mcg tablet    Other Relevant Orders    TSH with reflex to Free T4 if abnormal    Urge and stress incontinence    Relevant Medications    tolterodine LA (Detrol LA) 4 mg 24 hr capsule    Encounter for Medicare annual wellness exam     Wellness screenings/Immunizations:  Flu vaccination: Recommended annually  PCV: Deferred  PPSV: Previously given  Shingrix vaccine: Series complete  Colon cancer screening: Colonoscopy 5/2018, repeat 5/2028  Mammogram: Current as of 10/2023  DEXA scan: Current as of 10/2023            Other Visit Diagnoses       Routine general medical examination at health care facility    -  Primary    Osteopenia, unspecified location        Relevant Orders    Vitamin D 25-Hydroxy,Total (for eval of Vitamin D levels)            All pertinent lab work and results were reviewed with patient.     Follow up with me in 6 months    GEOFF Heath-CNS

## 2024-04-23 NOTE — ASSESSMENT & PLAN NOTE
Mildly hypertensive in office at 142/82  Home blood pressures running 110-130s/70-80s    Continue hydrochlorothiazide 25mg daily and nifedipine CC 30mg daily

## 2024-04-23 NOTE — ASSESSMENT & PLAN NOTE
Wellness screenings/Immunizations:  Flu vaccination: Recommended annually  PCV: Deferred  PPSV: Previously given  Shingrix vaccine: Series complete  Colon cancer screening: Colonoscopy 5/2018, repeat 5/2028  Mammogram: Current as of 10/2023  DEXA scan: Current as of 10/2023

## 2024-10-08 ENCOUNTER — APPOINTMENT (OUTPATIENT)
Dept: PRIMARY CARE | Facility: CLINIC | Age: 69
End: 2024-10-08
Payer: MEDICARE

## 2024-10-30 ASSESSMENT — ENCOUNTER SYMPTOMS
HEADACHES: 0
ORTHOPNEA: 0
HYPERTENSION: 1
BLURRED VISION: 0
PND: 0
SWEATS: 0
PALPITATIONS: 0
NECK PAIN: 0
SHORTNESS OF BREATH: 0

## 2024-10-31 ENCOUNTER — LAB (OUTPATIENT)
Dept: LAB | Facility: LAB | Age: 69
End: 2024-10-31
Payer: MEDICARE

## 2024-10-31 ENCOUNTER — HOSPITAL ENCOUNTER (OUTPATIENT)
Dept: RADIOLOGY | Facility: HOSPITAL | Age: 69
Discharge: HOME | End: 2024-10-31
Payer: MEDICARE

## 2024-10-31 VITALS — BODY MASS INDEX: 29.43 KG/M2 | WEIGHT: 217 LBS

## 2024-10-31 DIAGNOSIS — E06.3 HYPOTHYROIDISM DUE TO HASHIMOTO'S THYROIDITIS: ICD-10-CM

## 2024-10-31 DIAGNOSIS — Z12.31 SCREENING MAMMOGRAM FOR BREAST CANCER: ICD-10-CM

## 2024-10-31 DIAGNOSIS — M85.80 OSTEOPENIA, UNSPECIFIED LOCATION: ICD-10-CM

## 2024-10-31 DIAGNOSIS — I10 ESSENTIAL HYPERTENSION: ICD-10-CM

## 2024-10-31 LAB
25(OH)D3 SERPL-MCNC: 45 NG/ML (ref 30–100)
ALBUMIN SERPL BCP-MCNC: 4.2 G/DL (ref 3.4–5)
ALP SERPL-CCNC: 52 U/L (ref 33–136)
ALT SERPL W P-5'-P-CCNC: 14 U/L (ref 7–45)
ANION GAP SERPL CALC-SCNC: 10 MMOL/L (ref 10–20)
AST SERPL W P-5'-P-CCNC: 17 U/L (ref 9–39)
BILIRUB SERPL-MCNC: 0.5 MG/DL (ref 0–1.2)
BUN SERPL-MCNC: 27 MG/DL (ref 6–23)
CALCIUM SERPL-MCNC: 9.3 MG/DL (ref 8.6–10.3)
CHLORIDE SERPL-SCNC: 102 MMOL/L (ref 98–107)
CHOLEST SERPL-MCNC: 185 MG/DL (ref 0–199)
CHOLESTEROL/HDL RATIO: 3
CO2 SERPL-SCNC: 33 MMOL/L (ref 21–32)
CREAT SERPL-MCNC: 0.83 MG/DL (ref 0.5–1.05)
EGFRCR SERPLBLD CKD-EPI 2021: 77 ML/MIN/1.73M*2
ERYTHROCYTE [DISTWIDTH] IN BLOOD BY AUTOMATED COUNT: 13.2 % (ref 11.5–14.5)
GLUCOSE SERPL-MCNC: 97 MG/DL (ref 74–99)
HCT VFR BLD AUTO: 41.8 % (ref 36–46)
HDLC SERPL-MCNC: 61.8 MG/DL
HGB BLD-MCNC: 13.7 G/DL (ref 12–16)
LDLC SERPL CALC-MCNC: 107 MG/DL
MCH RBC QN AUTO: 31.6 PG (ref 26–34)
MCHC RBC AUTO-ENTMCNC: 32.8 G/DL (ref 32–36)
MCV RBC AUTO: 97 FL (ref 80–100)
NON HDL CHOLESTEROL: 123 MG/DL (ref 0–149)
NRBC BLD-RTO: 0 /100 WBCS (ref 0–0)
PLATELET # BLD AUTO: 233 X10*3/UL (ref 150–450)
POTASSIUM SERPL-SCNC: 4.3 MMOL/L (ref 3.5–5.3)
PROT SERPL-MCNC: 6.6 G/DL (ref 6.4–8.2)
RBC # BLD AUTO: 4.33 X10*6/UL (ref 4–5.2)
SODIUM SERPL-SCNC: 141 MMOL/L (ref 136–145)
TRIGL SERPL-MCNC: 82 MG/DL (ref 0–149)
TSH SERPL-ACNC: 3.16 MIU/L (ref 0.44–3.98)
VLDL: 16 MG/DL (ref 0–40)
WBC # BLD AUTO: 4.4 X10*3/UL (ref 4.4–11.3)

## 2024-10-31 PROCEDURE — 77063 BREAST TOMOSYNTHESIS BI: CPT | Performed by: RADIOLOGY

## 2024-10-31 PROCEDURE — 80061 LIPID PANEL: CPT

## 2024-10-31 PROCEDURE — 82306 VITAMIN D 25 HYDROXY: CPT

## 2024-10-31 PROCEDURE — 36415 COLL VENOUS BLD VENIPUNCTURE: CPT

## 2024-10-31 PROCEDURE — 85027 COMPLETE CBC AUTOMATED: CPT

## 2024-10-31 PROCEDURE — 77067 SCR MAMMO BI INCL CAD: CPT | Performed by: RADIOLOGY

## 2024-10-31 PROCEDURE — 77063 BREAST TOMOSYNTHESIS BI: CPT

## 2024-10-31 PROCEDURE — 80053 COMPREHEN METABOLIC PANEL: CPT

## 2024-10-31 PROCEDURE — 84443 ASSAY THYROID STIM HORMONE: CPT

## 2024-11-06 ENCOUNTER — APPOINTMENT (OUTPATIENT)
Dept: PRIMARY CARE | Facility: CLINIC | Age: 69
End: 2024-11-06
Payer: MEDICARE

## 2024-11-06 VITALS
RESPIRATION RATE: 16 BRPM | OXYGEN SATURATION: 97 % | HEIGHT: 72 IN | BODY MASS INDEX: 29.66 KG/M2 | SYSTOLIC BLOOD PRESSURE: 124 MMHG | HEART RATE: 88 BPM | WEIGHT: 219 LBS | TEMPERATURE: 98.2 F | DIASTOLIC BLOOD PRESSURE: 79 MMHG

## 2024-11-06 DIAGNOSIS — E06.3 HYPOTHYROIDISM DUE TO HASHIMOTO'S THYROIDITIS: ICD-10-CM

## 2024-11-06 DIAGNOSIS — N39.46 URGE AND STRESS INCONTINENCE: ICD-10-CM

## 2024-11-06 DIAGNOSIS — Z12.31 BREAST CANCER SCREENING BY MAMMOGRAM: ICD-10-CM

## 2024-11-06 DIAGNOSIS — I10 ESSENTIAL HYPERTENSION: ICD-10-CM

## 2024-11-06 DIAGNOSIS — E03.9 HYPOTHYROIDISM, UNSPECIFIED TYPE: Primary | ICD-10-CM

## 2024-11-06 PROCEDURE — 3008F BODY MASS INDEX DOCD: CPT

## 2024-11-06 PROCEDURE — 99214 OFFICE O/P EST MOD 30 MIN: CPT

## 2024-11-06 PROCEDURE — 1160F RVW MEDS BY RX/DR IN RCRD: CPT

## 2024-11-06 PROCEDURE — 3078F DIAST BP <80 MM HG: CPT

## 2024-11-06 PROCEDURE — 1159F MED LIST DOCD IN RCRD: CPT

## 2024-11-06 PROCEDURE — 1126F AMNT PAIN NOTED NONE PRSNT: CPT

## 2024-11-06 PROCEDURE — 3074F SYST BP LT 130 MM HG: CPT

## 2024-11-06 RX ORDER — LEVOTHYROXINE SODIUM 125 UG/1
125 TABLET ORAL DAILY
Qty: 100 TABLET | Refills: 3 | Status: SHIPPED | OUTPATIENT
Start: 2024-11-06

## 2024-11-06 RX ORDER — NIFEDIPINE 30 MG/1
30 TABLET, FILM COATED, EXTENDED RELEASE ORAL DAILY
Qty: 100 TABLET | Refills: 3 | Status: SHIPPED | OUTPATIENT
Start: 2024-11-06

## 2024-11-06 RX ORDER — TOLTERODINE 4 MG/1
4 CAPSULE, EXTENDED RELEASE ORAL DAILY
Qty: 100 CAPSULE | Refills: 3 | Status: SHIPPED | OUTPATIENT
Start: 2024-11-06

## 2024-11-06 RX ORDER — HYDROCHLOROTHIAZIDE 25 MG/1
25 TABLET ORAL DAILY
Qty: 100 TABLET | Refills: 3 | Status: SHIPPED | OUTPATIENT
Start: 2024-11-06

## 2024-11-06 ASSESSMENT — ENCOUNTER SYMPTOMS
SHORTNESS OF BREATH: 0
OCCASIONAL FEELINGS OF UNSTEADINESS: 0
CARDIOVASCULAR NEGATIVE: 1
ENDOCRINE NEGATIVE: 1
NEUROLOGICAL NEGATIVE: 1
LOSS OF SENSATION IN FEET: 0
CONSTITUTIONAL NEGATIVE: 1
PSYCHIATRIC NEGATIVE: 1
HEMATOLOGIC/LYMPHATIC NEGATIVE: 1
HEADACHES: 0
NECK PAIN: 0
PALPITATIONS: 0
MUSCULOSKELETAL NEGATIVE: 1
EYES NEGATIVE: 1
GASTROINTESTINAL NEGATIVE: 1
RESPIRATORY NEGATIVE: 1
DEPRESSION: 0

## 2024-11-06 ASSESSMENT — LIFESTYLE VARIABLES
HOW OFTEN DO YOU HAVE SIX OR MORE DRINKS ON ONE OCCASION: NEVER
HOW MANY STANDARD DRINKS CONTAINING ALCOHOL DO YOU HAVE ON A TYPICAL DAY: PATIENT DOES NOT DRINK
SKIP TO QUESTIONS 9-10: 1
HOW OFTEN DO YOU HAVE A DRINK CONTAINING ALCOHOL: NEVER
AUDIT-C TOTAL SCORE: 0

## 2024-11-06 ASSESSMENT — PATIENT HEALTH QUESTIONNAIRE - PHQ9
1. LITTLE INTEREST OR PLEASURE IN DOING THINGS: NOT AT ALL
2. FEELING DOWN, DEPRESSED OR HOPELESS: NOT AT ALL
SUM OF ALL RESPONSES TO PHQ9 QUESTIONS 1 AND 2: 0
1. LITTLE INTEREST OR PLEASURE IN DOING THINGS: NOT AT ALL
SUM OF ALL RESPONSES TO PHQ9 QUESTIONS 1 & 2: 0
2. FEELING DOWN, DEPRESSED OR HOPELESS: NOT AT ALL

## 2024-11-06 ASSESSMENT — PAIN SCALES - GENERAL: PAINLEVEL_OUTOF10: 0-NO PAIN

## 2024-11-06 NOTE — ASSESSMENT & PLAN NOTE
Normotensive in office at 124/79  Home blood pressures running 100-120/60-80s    Continue nifedipine ER 30 mg daily, hydrochlorothiazide 25 mg daily

## 2024-11-06 NOTE — PROGRESS NOTES
"Subjective   Patient ID: Radha Maya \"Ky" is a 69 y.o. female who presents for 6 month follow up of hypertension and hypothyroidism.    TSH in normal range however she reports feeling more tired and concerned about inability to lose weight.     Answers submitted by the patient for this visit:  High Blood Pressure Questionnaire (Submitted on 10/30/2024)  Chief Complaint: Hypertension  Chronicity: chronic  Onset: more than 1 year ago  Progression since onset: unchanged  Condition status: controlled  anxiety: No  blurred vision: No  malaise/fatigue: No  orthopnea: No  peripheral edema: No  PND: No  sweats: No  Agents associated with hypertension: thyroid hormones  CAD risks: family history, obesity  Compliance problems: no compliance problems    Diet: More veggies, low carbs, low fat, higher protein. No sugary beverages, sweets, trying to avoid processed foods/ chips.   Exercise: Going to the gym 4-5 times per week for about 1 hour per time (20 minutes bike or treadmill, rest of the time on machines)  Weight: Down 10 lbs since last visit  Water: 90 oz water minimum per day. 2 cups coffee per day  Sleep: Not getting a lot of deep sleep per her Fitbit, 7-8 hours per night. Sleep score in high 80s per fitbit  Social: , lives in [place] on [facility]. Used to live around [place], hasn't found a doctor in [place] yet.   Professional: Retired  from [employer]    Review of Systems   Constitutional: Negative.    HENT: Negative.     Eyes: Negative.    Respiratory: Negative.  Negative for shortness of breath.    Cardiovascular: Negative.  Negative for chest pain and palpitations.   Gastrointestinal: Negative.    Endocrine: Negative.    Genitourinary: Negative.    Musculoskeletal: Negative.  Negative for neck pain.   Skin: Negative.    Neurological: Negative.  Negative for headaches.   Hematological: Negative.    Psychiatric/Behavioral: Negative.          Current Outpatient Medications "   Medication Sig Dispense Refill    calcium carbonate 600 mg calcium (1,500 mg) tablet Take 1 tablet (600 mg) by mouth in the morning and 1 tablet (600 mg) in the evening. Take with meals.      fish,bora,flax oils-om3,6,9no1 (Omega 3-6-9) 1,200 mg capsule Take by mouth.      multivitamin with minerals (multivitamin-iron-folic acid) tablet Take 1 tablet by mouth once daily.      hydroCHLOROthiazide (HYDRODiuril) 25 mg tablet Take 1 tablet (25 mg) by mouth once daily. 100 tablet 3    levothyroxine (Synthroid, Levoxyl) 125 mcg tablet Take 1 tablet (125 mcg) by mouth once daily. 100 tablet 3    NIFEdipine ER (Adalat CC) 30 mg 24 hr tablet Take 1 tablet (30 mg) by mouth once daily. 100 tablet 3    tolterodine LA (Detrol LA) 4 mg 24 hr capsule Take 1 capsule (4 mg) by mouth once daily. Do not crush, chew, or split. 100 capsule 3     No current facility-administered medications for this visit.     Past Surgical History:   Procedure Laterality Date    APPENDECTOMY  35 yrs ago    CHOLECYSTECTOMY  20 yrs ago    COSMETIC SURGERY  20 yrs ago    HYSTERECTOMY  02/2010    JOINT REPLACEMENT  06/2022    OTHER SURGICAL HISTORY  11/12/2019    Bladder surgery    OTHER SURGICAL HISTORY  11/12/2019    Varicose vein ligation    OTHER SURGICAL HISTORY  11/12/2019    Jaw surgery    OTHER SURGICAL HISTORY  11/12/2019    Appendectomy    OTHER SURGICAL HISTORY  11/12/2019    Cholecystectomy    OTHER SURGICAL HISTORY  11/12/2019    Hysterectomy    OTHER SURGICAL HISTORY  11/18/2019    Cataract surgery    OTHER SURGICAL HISTORY  10/17/2022    Knee replacement    TONSILLECTOMY  60 yrs ago    WISDOM TOOTH EXTRACTION  47 yrs ago     Family History   Problem Relation Name Age of Onset    Arthritis Mother Angely Davis     Hearing loss Mother Angely Davis     Heart disease Mother Angely Davis     Hearing loss Father Amadeo Davis     Vision loss Father Amadeo Davis     Accidental death Sister Nuvia Davis     Accidental death Brother Tristan  Ryan     Cancer Maternal Grandmother Nancy Rosado     Breast cancer Maternal Grandmother Nancy Rosado     Cancer Paternal Grandmother Flory Davis     Cancer Other        Social History     Tobacco Use    Smoking status: Never     Passive exposure: Never    Smokeless tobacco: Never   Vaping Use    Vaping status: Never Used   Substance Use Topics    Alcohol use: Not Currently     Comment: Very occasionally/1-2 times per month    Drug use: Never        Objective     Visit Vitals  /79 (BP Location: Left arm, Patient Position: Sitting, BP Cuff Size: Adult)   Pulse 88   Temp 36.8 °C (98.2 °F) (Temporal)   Resp 16   Ht 1.829 m (6')   Wt 99.3 kg (219 lb)   SpO2 97%   BMI 29.70 kg/m²   OB Status Postmenopausal   Smoking Status Never   BSA 2.25 m²        Physical Exam  Constitutional:       Appearance: Normal appearance.   HENT:      Head: Normocephalic and atraumatic.   Eyes:      Extraocular Movements: Extraocular movements intact.      Pupils: Pupils are equal, round, and reactive to light.   Cardiovascular:      Rate and Rhythm: Normal rate and regular rhythm.   Pulmonary:      Effort: Pulmonary effort is normal.      Breath sounds: Normal breath sounds.   Abdominal:      General: Abdomen is flat. Bowel sounds are normal.      Palpations: Abdomen is soft.   Musculoskeletal:         General: Normal range of motion.   Skin:     General: Skin is warm and dry.      Capillary Refill: Capillary refill takes less than 2 seconds.   Neurological:      General: No focal deficit present.      Mental Status: She is alert and oriented to person, place, and time.   Psychiatric:         Mood and Affect: Mood normal.         Behavior: Behavior normal.         Assessment/Plan   Problem List Items Addressed This Visit       Essential hypertension     Normotensive in office at 124/79    Continue nifedipine ER 30 mg daily, hydrochlorothiazide 25 mg daily         Relevant Medications    hydroCHLOROthiazide (HYDRODiuril) 25 mg tablet     NIFEdipine ER (Adalat CC) 30 mg 24 hr tablet    Other Relevant Orders    Renal function panel    Hypothyroidism - Primary     TSH in normal range from labs 10/2024    Continue levothyroxine 125 mcg daily         Relevant Medications    levothyroxine (Synthroid, Levoxyl) 125 mcg tablet    Urge and stress incontinence    Relevant Medications    tolterodine LA (Detrol LA) 4 mg 24 hr capsule     Other Visit Diagnoses       Hypothyroidism due to Hashimoto's thyroiditis        Relevant Medications    levothyroxine (Synthroid, Levoxyl) 125 mcg tablet    Other Relevant Orders    TSH with reflex to Free T4 if abnormal    Breast cancer screening by mammogram        Relevant Orders    BI mammo bilateral screening tomosynthesis            All pertinent lab work and results were reviewed with patient.     Follow up with me in 6-12 months    GEOFF Heath-CNS

## 2024-11-06 NOTE — PATIENT INSTRUCTIONS
Thank you for coming to see me today.  If you have any questions or concerns following our visit, please contact the office.  Phone: [phone]    Follow up with me in 1 year or sooner as needed    1)  INCREASE levothyroxine. Take second tablet every other Sunday in addition to daily levothyroxine 125mcg daily    2) Get repeat non-fasting labs in 3 months and fasting labs to be done a in a year prior to next wellness visit. The lab is down the yi from our office.

## 2025-02-06 LAB
ALBUMIN SERPL-MCNC: 4.1 G/DL (ref 3.6–5.1)
BUN SERPL-MCNC: 16 MG/DL (ref 7–25)
BUN/CREAT SERPL: ABNORMAL (CALC) (ref 6–22)
CALCIUM SERPL-MCNC: 9.2 MG/DL (ref 8.6–10.4)
CHLORIDE SERPL-SCNC: 100 MMOL/L (ref 98–110)
CO2 SERPL-SCNC: 34 MMOL/L (ref 20–32)
CREAT SERPL-MCNC: 0.71 MG/DL (ref 0.5–1.05)
EGFRCR SERPLBLD CKD-EPI 2021: 93 ML/MIN/1.73M2
GLUCOSE SERPL-MCNC: 86 MG/DL (ref 65–99)
PHOSPHATE SERPL-MCNC: 3.7 MG/DL (ref 2.1–4.3)
POTASSIUM SERPL-SCNC: 4.1 MMOL/L (ref 3.5–5.3)
SODIUM SERPL-SCNC: 141 MMOL/L (ref 135–146)
TSH SERPL-ACNC: 0.63 MIU/L (ref 0.4–4.5)

## 2025-11-11 ENCOUNTER — APPOINTMENT (OUTPATIENT)
Dept: PRIMARY CARE | Facility: CLINIC | Age: 70
End: 2025-11-11
Payer: MEDICARE